# Patient Record
Sex: MALE | URBAN - METROPOLITAN AREA
[De-identification: names, ages, dates, MRNs, and addresses within clinical notes are randomized per-mention and may not be internally consistent; named-entity substitution may affect disease eponyms.]

---

## 2018-04-27 ENCOUNTER — DOCUMENTATION ONLY (OUTPATIENT)
Dept: PSYCHIATRY | Facility: HOSPITAL | Age: 53
End: 2018-04-27

## 2018-04-27 NOTE — PSYCH
Pt called inquiring about ABU program. SW reviewed program expectations, structure, and insurance coverage. Pt requested to start ASAP. SW provided available date. Pt expressed ambivalence and reported needing to speak with employer and schedule transport. SW discussed importance of scheduling date and accountability. Pt scheduled to start 5/1/2018. Pt reported that father may contact program and pt provided verbal consent for staff to speak w father.

## 2018-04-30 ENCOUNTER — DOCUMENTATION ONLY (OUTPATIENT)
Dept: PSYCHIATRY | Facility: HOSPITAL | Age: 53
End: 2018-04-30

## 2018-04-30 NOTE — PSYCH
SW contacted pt to confirm ABU appt. No answer. No dial tone and immediately goes to voicemail. SW left message to return call for admit instructions.

## 2018-04-30 NOTE — PSYCH
Sw contacted pt to confirm [facility] appt. No ring tone and immediately goes to voicemail. SW called x 3 and left 2 voice messages.

## 2018-05-01 ENCOUNTER — DOCUMENTATION ONLY (OUTPATIENT)
Dept: PSYCHIATRY | Facility: HOSPITAL | Age: 53
End: 2018-05-01

## 2018-05-01 NOTE — PSYCH
Pt and mother contacted [facility]BU reporting that [facility] was just [facility] after [facility]. 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 Pt requested to reschedule ABU start date to 5/3/2018. SW provided admit instructions. Pt expressed understanding.